# Patient Record
Sex: FEMALE | Race: WHITE | NOT HISPANIC OR LATINO | Employment: PART TIME | URBAN - METROPOLITAN AREA
[De-identification: names, ages, dates, MRNs, and addresses within clinical notes are randomized per-mention and may not be internally consistent; named-entity substitution may affect disease eponyms.]

---

## 2021-01-26 ENCOUNTER — HOSPITAL ENCOUNTER (EMERGENCY)
Facility: CLINIC | Age: 21
Discharge: HOME | End: 2021-01-27
Attending: FAMILY MEDICINE | Admitting: FAMILY MEDICINE
Payer: COMMERCIAL

## 2021-01-26 ENCOUNTER — APPOINTMENT (OUTPATIENT)
Dept: CT IMAGING | Facility: CLINIC | Age: 21
End: 2021-01-26
Attending: FAMILY MEDICINE
Payer: COMMERCIAL

## 2021-01-26 VITALS
RESPIRATION RATE: 16 BRPM | TEMPERATURE: 98.6 F | HEART RATE: 70 BPM | SYSTOLIC BLOOD PRESSURE: 110 MMHG | HEIGHT: 71 IN | WEIGHT: 170 LBS | DIASTOLIC BLOOD PRESSURE: 65 MMHG | BODY MASS INDEX: 23.8 KG/M2 | OXYGEN SATURATION: 99 %

## 2021-01-26 DIAGNOSIS — N39.0 UTI (URINARY TRACT INFECTION), BACTERIAL: ICD-10-CM

## 2021-01-26 DIAGNOSIS — R10.9 ABDOMINAL PAIN, UNSPECIFIED ABDOMINAL LOCATION: ICD-10-CM

## 2021-01-26 DIAGNOSIS — A49.9 UTI (URINARY TRACT INFECTION), BACTERIAL: ICD-10-CM

## 2021-01-26 PROBLEM — F90.0 ATTENTION DEFICIT HYPERACTIVITY DISORDER (ADHD), PREDOMINANTLY INATTENTIVE TYPE: Status: ACTIVE | Noted: 2018-08-02

## 2021-01-26 LAB
ALBUMIN SERPL-MCNC: 4.2 G/DL (ref 3.4–5)
ALBUMIN UR-MCNC: 30 MG/DL
ALP SERPL-CCNC: 109 U/L (ref 40–150)
ALT SERPL W P-5'-P-CCNC: 40 U/L (ref 0–50)
ANION GAP SERPL CALCULATED.3IONS-SCNC: 6 MMOL/L (ref 3–14)
APPEARANCE UR: ABNORMAL
AST SERPL W P-5'-P-CCNC: 25 U/L (ref 0–45)
BACTERIA #/AREA URNS HPF: ABNORMAL /HPF
BASOPHILS # BLD AUTO: 0.1 10E9/L (ref 0–0.2)
BASOPHILS NFR BLD AUTO: 0.9 %
BILIRUB SERPL-MCNC: 0.3 MG/DL (ref 0.2–1.3)
BILIRUB UR QL STRIP: NEGATIVE
BUN SERPL-MCNC: 18 MG/DL (ref 7–30)
CALCIUM SERPL-MCNC: 9.4 MG/DL (ref 8.5–10.1)
CHLORIDE SERPL-SCNC: 107 MMOL/L (ref 94–109)
CO2 SERPL-SCNC: 27 MMOL/L (ref 20–32)
COLOR UR AUTO: YELLOW
CREAT SERPL-MCNC: 0.76 MG/DL (ref 0.52–1.04)
DIFFERENTIAL METHOD BLD: NORMAL
EOSINOPHIL # BLD AUTO: 0.3 10E9/L (ref 0–0.7)
EOSINOPHIL NFR BLD AUTO: 3.2 %
ERYTHROCYTE [DISTWIDTH] IN BLOOD BY AUTOMATED COUNT: 11.9 % (ref 10–15)
GFR SERPL CREATININE-BSD FRML MDRD: >90 ML/MIN/{1.73_M2}
GLUCOSE SERPL-MCNC: 80 MG/DL (ref 70–99)
GLUCOSE UR STRIP-MCNC: NEGATIVE MG/DL
HCG UR QL: NEGATIVE
HCT VFR BLD AUTO: 37.9 % (ref 35–47)
HGB BLD-MCNC: 13.1 G/DL (ref 11.7–15.7)
HGB UR QL STRIP: ABNORMAL
IMM GRANULOCYTES # BLD: 0 10E9/L (ref 0–0.4)
IMM GRANULOCYTES NFR BLD: 0.2 %
KETONES UR STRIP-MCNC: NEGATIVE MG/DL
LEUKOCYTE ESTERASE UR QL STRIP: ABNORMAL
LIPASE SERPL-CCNC: 148 U/L (ref 73–393)
LYMPHOCYTES # BLD AUTO: 1.9 10E9/L (ref 0.8–5.3)
LYMPHOCYTES NFR BLD AUTO: 17.8 %
MCH RBC QN AUTO: 31.6 PG (ref 26.5–33)
MCHC RBC AUTO-ENTMCNC: 34.6 G/DL (ref 31.5–36.5)
MCV RBC AUTO: 91 FL (ref 78–100)
MONOCYTES # BLD AUTO: 0.8 10E9/L (ref 0–1.3)
MONOCYTES NFR BLD AUTO: 7.8 %
MUCOUS THREADS #/AREA URNS LPF: PRESENT /LPF
NEUTROPHILS # BLD AUTO: 7.5 10E9/L (ref 1.6–8.3)
NEUTROPHILS NFR BLD AUTO: 70.1 %
NITRATE UR QL: NEGATIVE
NRBC # BLD AUTO: 0 10*3/UL
NRBC BLD AUTO-RTO: 0 /100
PH UR STRIP: 5 PH (ref 5–7)
PLATELET # BLD AUTO: 321 10E9/L (ref 150–450)
POTASSIUM SERPL-SCNC: 3.8 MMOL/L (ref 3.4–5.3)
PROT SERPL-MCNC: 8.1 G/DL (ref 6.8–8.8)
RBC # BLD AUTO: 4.15 10E12/L (ref 3.8–5.2)
RBC #/AREA URNS AUTO: 12 /HPF (ref 0–2)
SODIUM SERPL-SCNC: 140 MMOL/L (ref 133–144)
SOURCE: ABNORMAL
SP GR UR STRIP: 1.03 (ref 1–1.03)
SQUAMOUS #/AREA URNS AUTO: 11 /HPF (ref 0–1)
UROBILINOGEN UR STRIP-MCNC: 0 MG/DL (ref 0–2)
WBC # BLD AUTO: 10.8 10E9/L (ref 4–11)
WBC #/AREA URNS AUTO: 20 /HPF (ref 0–5)

## 2021-01-26 PROCEDURE — 258N000003 HC RX IP 258 OP 636: Performed by: FAMILY MEDICINE

## 2021-01-26 PROCEDURE — 74176 CT ABD & PELVIS W/O CONTRAST: CPT

## 2021-01-26 PROCEDURE — 250N000013 HC RX MED GY IP 250 OP 250 PS 637: Performed by: FAMILY MEDICINE

## 2021-01-26 PROCEDURE — 96361 HYDRATE IV INFUSION ADD-ON: CPT | Performed by: FAMILY MEDICINE

## 2021-01-26 PROCEDURE — 85025 COMPLETE CBC W/AUTO DIFF WBC: CPT | Performed by: EMERGENCY MEDICINE

## 2021-01-26 PROCEDURE — 96375 TX/PRO/DX INJ NEW DRUG ADDON: CPT | Performed by: FAMILY MEDICINE

## 2021-01-26 PROCEDURE — 83690 ASSAY OF LIPASE: CPT | Performed by: EMERGENCY MEDICINE

## 2021-01-26 PROCEDURE — 250N000011 HC RX IP 250 OP 636: Performed by: FAMILY MEDICINE

## 2021-01-26 PROCEDURE — 80053 COMPREHEN METABOLIC PANEL: CPT | Performed by: EMERGENCY MEDICINE

## 2021-01-26 PROCEDURE — 81001 URINALYSIS AUTO W/SCOPE: CPT | Performed by: EMERGENCY MEDICINE

## 2021-01-26 PROCEDURE — 99284 EMERGENCY DEPT VISIT MOD MDM: CPT | Performed by: FAMILY MEDICINE

## 2021-01-26 PROCEDURE — 81025 URINE PREGNANCY TEST: CPT | Performed by: EMERGENCY MEDICINE

## 2021-01-26 PROCEDURE — 96374 THER/PROPH/DIAG INJ IV PUSH: CPT | Performed by: FAMILY MEDICINE

## 2021-01-26 PROCEDURE — 99285 EMERGENCY DEPT VISIT HI MDM: CPT | Mod: 25 | Performed by: FAMILY MEDICINE

## 2021-01-26 RX ORDER — SULFAMETHOXAZOLE/TRIMETHOPRIM 800-160 MG
1 TABLET ORAL ONCE
Status: COMPLETED | OUTPATIENT
Start: 2021-01-26 | End: 2021-01-26

## 2021-01-26 RX ORDER — ONDANSETRON 2 MG/ML
4 INJECTION INTRAMUSCULAR; INTRAVENOUS ONCE
Status: COMPLETED | OUTPATIENT
Start: 2021-01-26 | End: 2021-01-26

## 2021-01-26 RX ORDER — KETOROLAC TROMETHAMINE 15 MG/ML
10 INJECTION, SOLUTION INTRAMUSCULAR; INTRAVENOUS ONCE
Status: COMPLETED | OUTPATIENT
Start: 2021-01-26 | End: 2021-01-26

## 2021-01-26 RX ORDER — SULFAMETHOXAZOLE/TRIMETHOPRIM 800-160 MG
1 TABLET ORAL 2 TIMES DAILY
Qty: 20 TABLET | Refills: 0 | Status: SHIPPED | OUTPATIENT
Start: 2021-01-26 | End: 2021-01-29

## 2021-01-26 RX ORDER — ONDANSETRON 2 MG/ML
4 INJECTION INTRAMUSCULAR; INTRAVENOUS
Status: DISCONTINUED | OUTPATIENT
Start: 2021-01-26 | End: 2021-01-27 | Stop reason: HOSPADM

## 2021-01-26 RX ORDER — DEXTROAMPHETAMINE SACCHARATE, AMPHETAMINE ASPARTATE MONOHYDRATE, DEXTROAMPHETAMINE SULFATE AND AMPHETAMINE SULFATE 2.5; 2.5; 2.5; 2.5 MG/1; MG/1; MG/1; MG/1
10 CAPSULE, EXTENDED RELEASE ORAL DAILY
COMMUNITY
Start: 2020-10-31

## 2021-01-26 RX ADMIN — SODIUM CHLORIDE, POTASSIUM CHLORIDE, SODIUM LACTATE AND CALCIUM CHLORIDE 1000 ML: 600; 310; 30; 20 INJECTION, SOLUTION INTRAVENOUS at 22:35

## 2021-01-26 RX ADMIN — KETOROLAC TROMETHAMINE 10 MG: 15 INJECTION, SOLUTION INTRAMUSCULAR; INTRAVENOUS at 22:36

## 2021-01-26 RX ADMIN — SULFAMETHOXAZOLE AND TRIMETHOPRIM 1 TABLET: 800; 160 TABLET ORAL at 23:52

## 2021-01-26 RX ADMIN — ONDANSETRON 4 MG: 2 INJECTION INTRAMUSCULAR; INTRAVENOUS at 22:36

## 2021-01-26 ASSESSMENT — MIFFLIN-ST. JEOR: SCORE: 1637.24

## 2021-01-27 NOTE — ED PROVIDER NOTES
History     Chief Complaint   Patient presents with     Abdominal Pain     ruq pain and radiating to luq and back, nausea, no fevers     FLORI Noonan is a 21 year old female, past medical history is significant for ADHD, cognitive impairment, postconcussive headaches, vestibular dysfunction, depression, vasovagal syncope, presents to the emergency department concerns of abdominal pain beginning yesterday morning shortly after awakening but before eating.  The pain was initially is described as a sharp stabbing pain in the right upper quadrant almost like a rib was moving, it radiated gradually over the course of the next 24 to 36 hours across the abdomen and into the left side.  Through the back.  Mild nausea, no vomiting.  Denies fever chills or sweats.  No change in bowel habits specifically no constipation or diarrhea she could not recall her last bowel movement.  She notes no urinary tract symptoms such as frequency, urgency or dysuria.  Eating and drinking has no effect on the pain.  She has not taken anything for the pain.  She notes discomfort with bending forward or rotating through the chest and abdomen but denies any trauma or injury no lifting straining or bending that she could have injured her right anterolateral rib/chest area.  She has never had pain like this before.  Last menstrual period was 1 week ago she denies the possibility of pregnancy.    Allergies:  No Known Allergies    Problem List:    Patient Active Problem List    Diagnosis Date Noted     Attention deficit hyperactivity disorder (ADHD), predominantly inattentive type 08/02/2018     Priority: Medium     Cognitive impairment 12/14/2016     Priority: Medium     Post-concussion headache 04/13/2016     Priority: Medium     Vestibular dysfunction 04/13/2016     Priority: Medium     Vision disturbance 04/13/2016     Priority: Medium     Depression 08/03/2015     Priority: Medium     Seeing therapist at [facility] ([facility]).  "Started on Zoloft 50 mg (2/17/17). Will follow-up in 2-3 weeks       Concussion with no loss of consciousness 12/16/2014     Priority: Medium     Post concussion syndrome 12/14/2014     Priority: Medium     Seen by concussion clinic 12/8/14, after 3 days symptom free at rest is to retake the impact test       Chalazion 08/16/2014     Priority: Medium     Orthostatic hypotension 08/16/2014     Priority: Medium     Other acne 10/23/2012     Priority: Medium     Vaso vagal episode 02/11/2012     Priority: Medium     Has had several fainting spells. Evaluated by peds cardiology in 2010 - consistent with vaso-vagal syncope. Tall stature but no other stigmata of Marfan, no family history of Marfan syndrome.  Evaluated again 2015-see scan, holter moniter ordered and was normal, ekg normal, increase hydration and salt intake, check lipid profile at next well exam-no need for cardiology follow up          Past Medical History:    No past medical history on file.    Past Surgical History:    No past surgical history on file.    Family History:    No family history on file.    Social History:  Marital Status:  Single [1]  Social History     Tobacco Use     Smoking status: Not on file   Substance Use Topics     Alcohol use: Not on file     Drug use: Not on file        Medications:         amphetamine-dextroamphetamine (ADDERALL XR) 10 MG 24 hr capsule          Review of Systems   All other systems reviewed and are negative.      Physical Exam   BP: (!) 147/89  Pulse: 79  Temp: 98.6  F (37  C)  Height: 180.3 cm (5' 11\")  Weight: 77.1 kg (170 lb)  SpO2: 98 %      Physical Exam  Vitals signs and nursing note reviewed.   Constitutional:       General: She is not in acute distress.     Appearance: She is well-developed. She is obese. She is not ill-appearing.   HENT:      Head: Normocephalic and atraumatic.      Mouth/Throat:      Mouth: Mucous membranes are moist.   Eyes:      Extraocular Movements: Extraocular movements intact.    "   Pupils: Pupils are equal, round, and reactive to light.   Cardiovascular:      Rate and Rhythm: Normal rate and regular rhythm.      Heart sounds: Normal heart sounds.   Pulmonary:      Effort: Pulmonary effort is normal.      Breath sounds: Normal breath sounds.   Abdominal:      Comments: Soft obese abdomen, active bowel sounds.  Tender right upper quadrant and right anterolateral chest area, no skin changes no rash.  Tender in the right upper quadrant, right lower quadrant, left lower quadrant and left upper quadrant.  Voluntary guarding.  No rebound, distractible.  No CVA tenderness.   Skin:     General: Skin is warm and dry.      Capillary Refill: Capillary refill takes less than 2 seconds.   Neurological:      General: No focal deficit present.      Mental Status: She is alert.   Psychiatric:         Mood and Affect: Mood normal.         Behavior: Behavior normal.         ED Course        Procedures               Critical Care time:  none               Results for orders placed or performed during the hospital encounter of 01/26/21 (from the past 24 hour(s))   HCG qualitative urine (UPT)   Result Value Ref Range    HCG Qual Urine Negative NEG^Negative   CBC with platelets differential   Result Value Ref Range    WBC 10.8 4.0 - 11.0 10e9/L    RBC Count 4.15 3.8 - 5.2 10e12/L    Hemoglobin 13.1 11.7 - 15.7 g/dL    Hematocrit 37.9 35.0 - 47.0 %    MCV 91 78 - 100 fl    MCH 31.6 26.5 - 33.0 pg    MCHC 34.6 31.5 - 36.5 g/dL    RDW 11.9 10.0 - 15.0 %    Platelet Count 321 150 - 450 10e9/L    Diff Method Automated Method     % Neutrophils 70.1 %    % Lymphocytes 17.8 %    % Monocytes 7.8 %    % Eosinophils 3.2 %    % Basophils 0.9 %    % Immature Granulocytes 0.2 %    Nucleated RBCs 0 0 /100    Absolute Neutrophil 7.5 1.6 - 8.3 10e9/L    Absolute Lymphocytes 1.9 0.8 - 5.3 10e9/L    Absolute Monocytes 0.8 0.0 - 1.3 10e9/L    Absolute Eosinophils 0.3 0.0 - 0.7 10e9/L    Absolute Basophils 0.1 0.0 - 0.2 10e9/L    Abs  Immature Granulocytes 0.0 0 - 0.4 10e9/L    Absolute Nucleated RBC 0.0        Medications   ondansetron (ZOFRAN) injection 4 mg (has no administration in time range)   11:44 PM  Recheck on the patient at this time finds her comfortable.  We reviewed lab diagnostics as well as imaging.  Her urine appears somewhat contaminated with squamous epithelial cells, per discussion with the patient I will empirically cover her with a short course of antibiotic and observe for improvement although I suspect that her abdominal pain may be related to abdominal wall unappreciated injury or strain as a much more likely explanation given the behavior historically and her largely negative evaluation today.  I reassured her about the lack of concerning findings, no evidence to support a medical or surgical emergency and the disposition is to home.  Return as needed.    Assessments & Plan (with Medical Decision Making)   Assessments and plan with medical decision making at the time stamp above.    Disclaimer: This note consists of symbols derived from keyboarding, dictation and/or voice recognition software. As a result, there may be errors in the script that have gone undetected. Please consider this when interpreting information found in this chart.      I have reviewed the nursing notes.    I have reviewed the findings, diagnosis, plan and need for follow up with the patient.          New Prescriptions    No medications on file       Final diagnoses:   None       1/26/2021   [facility] EMERGENCY DEPT     Steve Thompson MD  01/26/21 [id number]

## 2021-01-27 NOTE — ED NOTES
"Pt c/o RUQ pain that radiates to LUQ and bilat upper back, started yesterday morning. Nausea no vomiting, no diarrhea, last BM this AM. No change in pain with eating. Pain described at \"constricting\". Pain is constant, getting worse. Pain worse with movement.   "

## 2022-05-11 ENCOUNTER — HOSPITAL ENCOUNTER (EMERGENCY)
Facility: CLINIC | Age: 22
Discharge: HOME | End: 2022-05-11
Attending: EMERGENCY MEDICINE | Admitting: EMERGENCY MEDICINE
Payer: COMMERCIAL

## 2022-05-11 VITALS
DIASTOLIC BLOOD PRESSURE: 88 MMHG | RESPIRATION RATE: 18 BRPM | HEART RATE: 77 BPM | TEMPERATURE: 97.5 F | OXYGEN SATURATION: 100 % | WEIGHT: 190 LBS | HEIGHT: 71 IN | SYSTOLIC BLOOD PRESSURE: 137 MMHG | BODY MASS INDEX: 26.6 KG/M2

## 2022-05-11 DIAGNOSIS — R09.A2 FOREIGN BODY SENSATION IN THROAT: ICD-10-CM

## 2022-05-11 PROCEDURE — 99284 EMERGENCY DEPT VISIT MOD MDM: CPT | Performed by: EMERGENCY MEDICINE

## 2022-05-11 PROCEDURE — 99283 EMERGENCY DEPT VISIT LOW MDM: CPT | Performed by: EMERGENCY MEDICINE

## 2022-05-11 RX ORDER — IBUPROFEN 200 MG
600-800 TABLET ORAL EVERY 8 HOURS PRN
Qty: 30 TABLET | Refills: 0 | COMMUNITY
Start: 2022-05-11 | End: 2022-05-16

## 2022-05-12 ASSESSMENT — ENCOUNTER SYMPTOMS
ABDOMINAL PAIN: 0
FATIGUE: 0
TROUBLE SWALLOWING: 0
DIARRHEA: 0
FEVER: 0
APPETITE CHANGE: 0
COUGH: 0
SHORTNESS OF BREATH: 0
BACK PAIN: 0
CHEST TIGHTNESS: 0
LIGHT-HEADEDNESS: 0
SORE THROAT: 0
NERVOUS/ANXIOUS: 0
HEADACHES: 0
CHILLS: 0
NAUSEA: 0
VOICE CHANGE: 0
VOMITING: 0

## 2022-05-12 NOTE — ED TRIAGE NOTES
Pt feels like something in stuck on right side of throat.  No difficulty talking or breathing at this time   Triage Assessment     Row Name 05/11/22 2124       Triage Assessment (Adult)    Airway WDL WDL       Respiratory WDL    Respiratory WDL WDL       Skin Circulation/Temperature WDL    Skin Circulation/Temperature WDL WDL       Cognitive/Neuro/Behavioral WDL    Cognitive/Neuro/Behavioral WDL WDL

## 2022-05-12 NOTE — DISCHARGE INSTRUCTIONS
The exact cause of your symptoms is unclear at this time.  If your symptoms continue or worsen, please follow-up with your primary care doctor as soon as possible or return to the emergency department for a repeat evaluation.

## 2022-05-12 NOTE — ED PROVIDER NOTES
History     Chief Complaint   Patient presents with     Swallowed Foreign Body     HPI  Radha Noonan is a 22 year old female with no significant contributing past medical history presenting for evaluation of foreign body sensation in her throat.  Patient reports she was feeling well during the day.  Last ate around 3 PM and around 7 PM started to feel a swelling sensation in the right side of her throat.  Patient felt the sensation deep in her throat on the right side in her neck.  Reports some discomfort with swallowing but no distinct pain.  Patient reports she felt the swelling progress over the course of the next hour or so but has since plateaued with no further worsening.  She continues to feel that sensation of a swelling pressure on the right side of her throat but has no difficulty swallowing or breathing.  Patient reports she tried drinking some water and eating some Doritos chips and these were swallowed without difficulty.  Denies any significant pain with swallowing.  Denies any cough or difficulty breathing.  Patient was concerned however that the swelling might continue and block off her airway which is what prompted her to come in for evaluation.  She was concerned about possible allergic reaction but has no other symptoms.  She denies any rash, pruritus, nausea, GI discomfort, or swelling.  No known allergy exposure.    Allergies:  No Known Allergies    Problem List:    Patient Active Problem List    Diagnosis Date Noted     Attention deficit hyperactivity disorder (ADHD), predominantly inattentive type 08/02/2018     Priority: Medium     Cognitive impairment 12/14/2016     Priority: Medium     Post-concussion headache 04/13/2016     Priority: Medium     Vestibular dysfunction 04/13/2016     Priority: Medium     Vision disturbance 04/13/2016     Priority: Medium     Depression 08/03/2015     Priority: Medium     Seeing therapist at [facility] ([facility] Started on Zoloft 50 mg (2/17/17).  Will follow-up in 2-3 weeks       Concussion with no loss of consciousness 12/16/2014     Priority: Medium     Post concussion syndrome 12/14/2014     Priority: Medium     Seen by concussion clinic 12/8/14, after 3 days symptom free at rest is to retake the impact test       Chalazion 08/16/2014     Priority: Medium     Orthostatic hypotension 08/16/2014     Priority: Medium     Other acne 10/23/2012     Priority: Medium     Vaso vagal episode 02/11/2012     Priority: Medium     Has had several fainting spells. Evaluated by peds cardiology in 2010 - consistent with vaso-vagal syncope. Tall stature but no other stigmata of Marfan, no family history of Marfan syndrome.  Evaluated again 2015-see scan, holter moniter ordered and was normal, ekg normal, increase hydration and salt intake, check lipid profile at next well exam-no need for cardiology follow up          Past Medical History:    No past medical history on file.    Past Surgical History:    No past surgical history on file.    Family History:    No family history on file.    Social History:  Marital Status:  Single [1]        Medications:    ibuprofen (ADVIL/MOTRIN) 200 MG tablet  amphetamine-dextroamphetamine (ADDERALL XR) 10 MG 24 hr capsule          Review of Systems   Constitutional: Negative for appetite change, chills, fatigue and fever.   HENT: Negative for congestion, sore throat, trouble swallowing and voice change.         Abnormal foreign body sensation on the right side of her throat   Respiratory: Negative for cough, chest tightness and shortness of breath.    Cardiovascular: Negative for chest pain.   Gastrointestinal: Negative for abdominal pain, diarrhea, nausea and vomiting.   Genitourinary: Negative for decreased urine volume.   Musculoskeletal: Negative for back pain.   Skin: Negative for rash.   Neurological: Negative for light-headedness and headaches.   Psychiatric/Behavioral: The patient is not nervous/anxious.    All other systems  "reviewed and are negative.      Physical Exam   BP: 137/88  Pulse: 77  Temp: 97.5  F (36.4  C)  Resp: 18  Height: 180.3 cm (5' 11\")  Weight: 86.2 kg (190 lb)  SpO2: 100 %      Physical Exam  Vitals and nursing note reviewed.   Constitutional:       Appearance: Normal appearance. She is not ill-appearing or diaphoretic.      Comments: Awake and alert laying semi-reclined in no distress   HENT:      Head: Atraumatic.      Nose: Nose normal.      Mouth/Throat:      Mouth: Mucous membranes are moist.      Pharynx: Oropharynx is clear. No oropharyngeal exudate or posterior oropharyngeal erythema.      Comments: No visible tonsillar hypertrophy  Eyes:      Conjunctiva/sclera: Conjunctivae normal.   Neck:      Trachea: Phonation normal. No tracheal tenderness, abnormal tracheal secretions or tracheal deviation.      Comments: Unable to palpate any abnormal mass in the neck.  Normal carotid pulses.  Auscultation does not reveal an abnormal vascular sound  Cardiovascular:      Rate and Rhythm: Normal rate.      Pulses: Normal pulses.   Pulmonary:      Effort: Pulmonary effort is normal.      Breath sounds: Normal breath sounds.   Abdominal:      Palpations: Abdomen is soft.      Tenderness: There is no abdominal tenderness.   Musculoskeletal:         General: Normal range of motion.      Cervical back: Full passive range of motion without pain, normal range of motion and neck supple. No tenderness. Normal range of motion.   Lymphadenopathy:      Cervical: No cervical adenopathy.   Skin:     General: Skin is warm and dry.      Capillary Refill: Capillary refill takes less than 2 seconds.   Neurological:      Mental Status: She is alert and oriented to person, place, and time.   Psychiatric:         Mood and Affect: Mood normal.         ED Course                 Procedures                No results found for this or any previous visit (from the past 24 hour(s)).    Medications - No data to display    Assessments & Plan (with " Medical Decision Making)  Well-appearing 22-year-old presenting for evaluation of foreign body sensation in her throat.  Has been able to swallow solids and liquids without difficulty and is breathing normally with normal phonation.  No visible abnormality within the oropharynx and no palpable abnormality within the neck.  Symptoms occurred spontaneously without recent ingestion.  Considerations include infectious cause, foreign body, vascular aneurysm, lymphadenopathy. The exact cause of symptoms unclear but symptoms have not been worsening over the past several hours.  She is afebrile in no distress, swallowing and breathing without difficulty.  No change in voice.  Recommended rest and monitoring with strict return precautions if new or concerning symptoms develop.     I have reviewed the nursing notes.    I have reviewed the findings, diagnosis, plan and need for follow up with the patient.       Discharge Medication List as of 5/11/2022 11:26 PM      START taking these medications    Details   ibuprofen (ADVIL/MOTRIN) 200 MG tablet Take 3-4 tablets (600-800 mg) by mouth every 8 hours as needed for mild pain, Disp-30 tablet, R-0, OTC             Final diagnoses:   Foreign body sensation in throat - on right side       5/11/2022   [facility] EMERGENCY DEPT     Hernandez, Tc Scruggs MD  05/12/22 [id number]

## 2022-10-21 ENCOUNTER — HOSPITAL ENCOUNTER (EMERGENCY)
Facility: CLINIC | Age: 22
Discharge: HOME | End: 2022-10-21
Attending: FAMILY MEDICINE | Admitting: FAMILY MEDICINE
Payer: COMMERCIAL

## 2022-10-21 VITALS
WEIGHT: 190 LBS | SYSTOLIC BLOOD PRESSURE: 116 MMHG | TEMPERATURE: 97.3 F | HEART RATE: 67 BPM | DIASTOLIC BLOOD PRESSURE: 68 MMHG | RESPIRATION RATE: 18 BRPM | HEIGHT: 71 IN | OXYGEN SATURATION: 99 % | BODY MASS INDEX: 26.6 KG/M2

## 2022-10-21 DIAGNOSIS — R10.13 EPIGASTRIC PAIN: ICD-10-CM

## 2022-10-21 LAB
ALBUMIN SERPL BCG-MCNC: 4.3 G/DL (ref 3.5–5.2)
ALP SERPL-CCNC: 100 U/L (ref 35–104)
ALT SERPL W P-5'-P-CCNC: 14 U/L (ref 10–35)
ANION GAP SERPL CALCULATED.3IONS-SCNC: 10 MMOL/L (ref 7–15)
AST SERPL W P-5'-P-CCNC: 16 U/L (ref 10–35)
BASOPHILS # BLD AUTO: 0.1 10E3/UL (ref 0–0.2)
BASOPHILS NFR BLD AUTO: 1 %
BILIRUB SERPL-MCNC: 0.3 MG/DL
BUN SERPL-MCNC: 11.5 MG/DL (ref 6–20)
CALCIUM SERPL-MCNC: 9.2 MG/DL (ref 8.6–10)
CHLORIDE SERPL-SCNC: 107 MMOL/L (ref 98–107)
CREAT SERPL-MCNC: 0.77 MG/DL (ref 0.51–0.95)
DEPRECATED HCO3 PLAS-SCNC: 23 MMOL/L (ref 22–29)
EOSINOPHIL # BLD AUTO: 0.2 10E3/UL (ref 0–0.7)
EOSINOPHIL NFR BLD AUTO: 2 %
ERYTHROCYTE [DISTWIDTH] IN BLOOD BY AUTOMATED COUNT: 11.9 % (ref 10–15)
GFR SERPL CREATININE-BSD FRML MDRD: >90 ML/MIN/1.73M2
GLUCOSE SERPL-MCNC: 97 MG/DL (ref 70–99)
HCG SERPL QL: NEGATIVE
HCT VFR BLD AUTO: 39.2 % (ref 35–47)
HGB BLD-MCNC: 13.1 G/DL (ref 11.7–15.7)
HOLD SPECIMEN: NORMAL
IMM GRANULOCYTES # BLD: 0 10E3/UL
IMM GRANULOCYTES NFR BLD: 0 %
LIPASE SERPL-CCNC: 33 U/L (ref 13–60)
LYMPHOCYTES # BLD AUTO: 1.4 10E3/UL (ref 0.8–5.3)
LYMPHOCYTES NFR BLD AUTO: 19 %
MCH RBC QN AUTO: 30.3 PG (ref 26.5–33)
MCHC RBC AUTO-ENTMCNC: 33.4 G/DL (ref 31.5–36.5)
MCV RBC AUTO: 91 FL (ref 78–100)
MONOCYTES # BLD AUTO: 0.6 10E3/UL (ref 0–1.3)
MONOCYTES NFR BLD AUTO: 8 %
NEUTROPHILS # BLD AUTO: 5.2 10E3/UL (ref 1.6–8.3)
NEUTROPHILS NFR BLD AUTO: 70 %
NRBC # BLD AUTO: 0 10E3/UL
NRBC BLD AUTO-RTO: 0 /100
PLATELET # BLD AUTO: 297 10E3/UL (ref 150–450)
POTASSIUM SERPL-SCNC: 4.3 MMOL/L (ref 3.4–5.3)
PROT SERPL-MCNC: 7.5 G/DL (ref 6.4–8.3)
RBC # BLD AUTO: 4.32 10E6/UL (ref 3.8–5.2)
SODIUM SERPL-SCNC: 140 MMOL/L (ref 136–145)
WBC # BLD AUTO: 7.4 10E3/UL (ref 4–11)

## 2022-10-21 PROCEDURE — 82040 ASSAY OF SERUM ALBUMIN: CPT | Performed by: FAMILY MEDICINE

## 2022-10-21 PROCEDURE — 258N000003 HC RX IP 258 OP 636: Performed by: FAMILY MEDICINE

## 2022-10-21 PROCEDURE — 250N000011 HC RX IP 250 OP 636: Performed by: FAMILY MEDICINE

## 2022-10-21 PROCEDURE — 83690 ASSAY OF LIPASE: CPT | Performed by: FAMILY MEDICINE

## 2022-10-21 PROCEDURE — 36415 COLL VENOUS BLD VENIPUNCTURE: CPT | Performed by: FAMILY MEDICINE

## 2022-10-21 PROCEDURE — 99284 EMERGENCY DEPT VISIT MOD MDM: CPT | Performed by: FAMILY MEDICINE

## 2022-10-21 PROCEDURE — 96361 HYDRATE IV INFUSION ADD-ON: CPT | Performed by: FAMILY MEDICINE

## 2022-10-21 PROCEDURE — 85025 COMPLETE CBC W/AUTO DIFF WBC: CPT | Performed by: FAMILY MEDICINE

## 2022-10-21 PROCEDURE — 96374 THER/PROPH/DIAG INJ IV PUSH: CPT | Performed by: FAMILY MEDICINE

## 2022-10-21 PROCEDURE — 99284 EMERGENCY DEPT VISIT MOD MDM: CPT | Mod: 25 | Performed by: FAMILY MEDICINE

## 2022-10-21 PROCEDURE — C9113 INJ PANTOPRAZOLE SODIUM, VIA: HCPCS | Performed by: FAMILY MEDICINE

## 2022-10-21 PROCEDURE — 80053 COMPREHEN METABOLIC PANEL: CPT | Performed by: FAMILY MEDICINE

## 2022-10-21 PROCEDURE — 84703 CHORIONIC GONADOTROPIN ASSAY: CPT | Performed by: FAMILY MEDICINE

## 2022-10-21 RX ORDER — PANTOPRAZOLE SODIUM 40 MG/1
40 TABLET, DELAYED RELEASE ORAL DAILY
Qty: 14 TABLET | Refills: 0 | Status: SHIPPED | OUTPATIENT
Start: 2022-10-21 | End: 2022-11-04

## 2022-10-21 RX ADMIN — SODIUM CHLORIDE, POTASSIUM CHLORIDE, SODIUM LACTATE AND CALCIUM CHLORIDE 1000 ML: 600; 310; 30; 20 INJECTION, SOLUTION INTRAVENOUS at 14:16

## 2022-10-21 RX ADMIN — PANTOPRAZOLE SODIUM 40 MG: 40 INJECTION, POWDER, FOR SOLUTION INTRAVENOUS at 14:16

## 2022-10-21 ASSESSMENT — ACTIVITIES OF DAILY LIVING (ADL)
ADLS_ACUITY_SCORE: 35
ADLS_ACUITY_SCORE: 35

## 2022-10-21 NOTE — ED NOTES
Pt continues to experience abdominal pain located in ULQ and radiates directly to upper back (L side). Pt states that pain is constant but will increase to severe intervals after eating or moving.   Pt denies nausea when sitting but present with movement. Pt vomited this morning.   Pt denies diarrhea.

## 2022-10-21 NOTE — ED PROVIDER NOTES
History     Chief Complaint   Patient presents with     Abdominal Pain     HPI  Radha Noonan is a 22 year old female, past medical history significant for ADHD, cognitive impairment, depression, orthostatic hypotension, presents to the emergency department concerns of abdominal pain.  History is obtained from the patient who identifies onset of epigastric/left upper quadrant abdominal pain shortly after drinking some water this morning just prior to arrival.  Severe 10 on 10 pain sharp stabbing burning aching without radiation up into the chest but through to the back and also to the left side.  1 episode of emesis with some improvement.  She tried eating some chicken soup and also a peanut since the time and was able to keep those down but eating seem to make the pain worse.  Her pain currently is about a 3/10 and she declined medication for pain.  She has not had pain like this before.  She drank considerable alcohol last night by her account mixed drinks to 2 or 3.  Nothing this morning.  Last bowel movement was yesterday before onset of symptoms.  No BM today.  No difficulties with urination.    Allergies:  No Known Allergies    Problem List:    Patient Active Problem List    Diagnosis Date Noted     Attention deficit hyperactivity disorder (ADHD), predominantly inattentive type 08/02/2018     Priority: Medium     Cognitive impairment 12/14/2016     Priority: Medium     Post-concussion headache 04/13/2016     Priority: Medium     Vestibular dysfunction 04/13/2016     Priority: Medium     Vision disturbance 04/13/2016     Priority: Medium     Depression 08/03/2015     Priority: Medium     Seeing therapist at [facility] ([facility] Started on Zoloft 50 mg (2/17/17). Will follow-up in 2-3 weeks       Concussion with no loss of consciousness 12/16/2014     Priority: Medium     Post concussion syndrome 12/14/2014     Priority: Medium     Seen by concussion clinic 12/8/14, after 3 days symptom free at rest  "is to retake the impact test       Chalazion 08/16/2014     Priority: Medium     Orthostatic hypotension 08/16/2014     Priority: Medium     Other acne 10/23/2012     Priority: Medium     Vaso vagal episode 02/11/2012     Priority: Medium     Has had several fainting spells. Evaluated by peds cardiology in 2010 - consistent with vaso-vagal syncope. Tall stature but no other stigmata of Marfan, no family history of Marfan syndrome.  Evaluated again 2015-see scan, holter moniter ordered and was normal, ekg normal, increase hydration and salt intake, check lipid profile at next well exam-no need for cardiology follow up          Past Medical History:    No past medical history on file.    Past Surgical History:    No past surgical history on file.    Family History:    No family history on file.    Social History:  Marital Status:  Single [1]        Medications:    pantoprazole (PROTONIX) 40 MG EC tablet  amphetamine-dextroamphetamine (ADDERALL XR) 10 MG 24 hr capsule          Review of Systems   All other systems reviewed and are negative.      Physical Exam   BP: (!) 162/91  Pulse: 94  Temp: 97.3  F (36.3  C)  Resp: 18  Height: 180.3 cm (5' 11\")  Weight: 86.2 kg (190 lb)  SpO2: 98 %      Physical Exam  Vitals and nursing note reviewed.   Constitutional:       General: She is not in acute distress.     Appearance: She is well-developed. She is not ill-appearing.   HENT:      Head: Normocephalic and atraumatic.      Mouth/Throat:      Mouth: Mucous membranes are moist.      Pharynx: Oropharynx is clear.   Eyes:      Extraocular Movements: Extraocular movements intact.      Pupils: Pupils are equal, round, and reactive to light.   Cardiovascular:      Rate and Rhythm: Normal rate and regular rhythm.      Heart sounds: Normal heart sounds.   Pulmonary:      Effort: Pulmonary effort is normal.      Breath sounds: Normal breath sounds.   Abdominal:      Comments: Soft, bowel sounds present, tender epigastrium and left " upper quadrant with voluntary guarding.  No rebound no referred pain.  No CVA tenderness.   Skin:     General: Skin is warm and dry.      Capillary Refill: Capillary refill takes less than 2 seconds.   Neurological:      General: No focal deficit present.      Mental Status: She is alert.   Psychiatric:         Mood and Affect: Mood normal.         Behavior: Behavior normal.         ED Course                 Procedures              Critical Care time:  none               Results for orders placed or performed during the hospital encounter of 10/21/22 (from the past 24 hour(s))   [place] Draw    Narrative    The following orders were created for panel order [place] Draw.  Procedure                               Abnormality         Status                     ---------                               -----------         ------                     Extra Blue Top Tube[[id number]]                              Final result               Extra Red Top Tube[[id number]]                               Final result               Extra Green Top (Lithium...[[id number]]                      Final result               Extra Purple Top Tube[[id number]]                            Final result                 Please view results for these tests on the individual orders.   Extra Blue Top Tube   Result Value Ref Range    Hold Specimen JIC    Extra Red Top Tube   Result Value Ref Range    Hold Specimen JIC    Extra Green Top (Lithium Heparin) Tube   Result Value Ref Range    Hold Specimen JIC    Extra Purple Top Tube   Result Value Ref Range    Hold Specimen JIC    CBC with platelets, differential    Narrative    The following orders were created for panel order CBC with platelets, differential.  Procedure                               Abnormality         Status                     ---------                               -----------         ------                     CBC with platelets and d...[[id number]]                      Final result                  Please view results for these tests on the individual orders.   Comprehensive metabolic panel   Result Value Ref Range    Sodium 140 136 - 145 mmol/L    Potassium 4.3 3.4 - 5.3 mmol/L    Chloride 107 98 - 107 mmol/L    Carbon Dioxide (CO2) 23 22 - 29 mmol/L    Anion Gap 10 7 - 15 mmol/L    Urea Nitrogen 11.5 6.0 - 20.0 mg/dL    Creatinine 0.77 0.51 - 0.95 mg/dL    Calcium 9.2 8.6 - 10.0 mg/dL    Glucose 97 70 - 99 mg/dL    Alkaline Phosphatase 100 35 - 104 U/L    AST 16 10 - 35 U/L    ALT 14 10 - 35 U/L    Protein Total 7.5 6.4 - 8.3 g/dL    Albumin 4.3 3.5 - 5.2 g/dL    Bilirubin Total 0.3 <=1.2 mg/dL    GFR Estimate >90 >60 mL/min/1.73m2   Lipase   Result Value Ref Range    Lipase 33 13 - 60 U/L   HCG qualitative pregnancy (blood)   Result Value Ref Range    hCG Serum Qualitative Negative Negative   CBC with platelets and differential   Result Value Ref Range    WBC Count 7.4 4.0 - 11.0 10e3/uL    RBC Count 4.32 3.80 - 5.20 10e6/uL    Hemoglobin 13.1 11.7 - 15.7 g/dL    Hematocrit 39.2 35.0 - 47.0 %    MCV 91 78 - 100 fL    MCH 30.3 26.5 - 33.0 pg    MCHC 33.4 31.5 - 36.5 g/dL    RDW 11.9 10.0 - 15.0 %    Platelet Count 297 150 - 450 10e3/uL    % Neutrophils 70 %    % Lymphocytes 19 %    % Monocytes 8 %    % Eosinophils 2 %    % Basophils 1 %    % Immature Granulocytes 0 %    NRBCs per 100 WBC 0 <1 /100    Absolute Neutrophils 5.2 1.6 - 8.3 10e3/uL    Absolute Lymphocytes 1.4 0.8 - 5.3 10e3/uL    Absolute Monocytes 0.6 0.0 - 1.3 10e3/uL    Absolute Eosinophils 0.2 0.0 - 0.7 10e3/uL    Absolute Basophils 0.1 0.0 - 0.2 10e3/uL    Absolute Immature Granulocytes 0.0 <=0.4 10e3/uL    Absolute NRBCs 0.0 10e3/uL   [place] Draw    Narrative    The following orders were created for panel order [place] Draw.  Procedure                               Abnormality         Status                     ---------                               -----------         ------                     Extra Green Top  (Lithium...[[id number]]                      Final result                 Please view results for these tests on the individual orders.   Extra Green Top (Lithium Heparin) ON ICE   Result Value Ref Range    Hold Specimen JIC      5:00 PM  Normal labs and the patient's symptoms have improved down to a 1-2/2010 after the IV Protonix and no other medications given.  Given her history I suspect that her abdominal pain is related to alcoholic indulgence last night and some irritation of her stomach and esophagus in the form of acute alcoholic gastritis/esophagitis.  I have asked her to stop alcohol intake and I have placed her on Protonix once daily for 14 days.  We discussed the possibility of further evaluation at this time however my suspicion is quite low for serious intra-abdominal pathology and I do not feel that at this time I would obtain ultrasound of the abdomen to evaluate for the possibility of atypical biliary colic/cholecystitis.  Her white cell count is normal and her LFTs are normal as well.  I think this would be of low yield but I did discuss it with the patient.  We will plan on 2 weeks of the PPI, avoidance of alcohol if symptoms persist or worsen she will return to the ER otherwise to follow-up in clinic with her primary care provider.    Medications   lactated ringers BOLUS 1,000 mL (1,000 mLs Intravenous New Bag 10/21/22 1416)   pantoprazole (PROTONIX) IV push injection 40 mg (40 mg Intravenous Given 10/21/22 1416)       Assessments & Plan (with Medical Decision Making)   Assessments and plan with medical decision making at the time stamp above    Disclaimer: This note consists of symbols derived from keyboarding, dictation and/or voice recognition software. As a result, there may be errors in the script that have gone undetected. Please consider this when interpreting information found in this chart.      I have reviewed the nursing notes.    I have reviewed the findings, diagnosis, plan and need  for follow up with the patient.       New Prescriptions    PANTOPRAZOLE (PROTONIX) 40 MG EC TABLET    Take 1 tablet (40 mg) by mouth daily for 14 doses       Final diagnoses:   Epigastric pain - Suspect gastritis       10/21/2022   [facility] EMERGENCY DEPT     [facility], Steve Workman MD  10/21/22 1261

## 2022-10-21 NOTE — DISCHARGE INSTRUCTIONS
As I discussed with you I think it is quite likely that the discomfort that you experienced is related to recent alcohol use and its irritation of your stomach.  I plan to give you 2 weeks of Protonix.  You should avoid alcohol as much as possible.  If you have recurrence of symptoms despite Protonix you can return to the emergency department or follow-up in clinic with your primary care provider.

## 2022-10-21 NOTE — ED TRIAGE NOTES
Pt here with abdominal pain that radiates into her back. Woke up feeling fine this AM. Had a glass of water and 30 min later had a sudden onset of upper abdominal pain that radiated into her back mostly on the left side. Vomited once. Denied fever. No diarrhea. Continues to have constant 3/10 pain with stabs of 7/10 pain. Pain is worse after eating.      Triage Assessment     Row Name 10/21/22 0542       Triage Assessment (Adult)    Airway WDL WDL       Respiratory WDL    Respiratory WDL WDL       Skin Circulation/Temperature WDL    Skin Circulation/Temperature WDL WDL       Cardiac WDL    Cardiac WDL WDL       Peripheral/Neurovascular WDL    Peripheral Neurovascular WDL WDL       Cognitive/Neuro/Behavioral WDL    Cognitive/Neuro/Behavioral WDL WDL

## 2023-04-12 ENCOUNTER — HOSPITAL ENCOUNTER (EMERGENCY)
Facility: CLINIC | Age: 23
Discharge: HOME | End: 2023-04-12
Payer: COMMERCIAL

## 2023-04-12 ENCOUNTER — APPOINTMENT (OUTPATIENT)
Dept: GENERAL RADIOLOGY | Facility: CLINIC | Age: 23
End: 2023-04-12
Payer: COMMERCIAL

## 2023-04-12 VITALS
SYSTOLIC BLOOD PRESSURE: 122 MMHG | HEIGHT: 71 IN | HEART RATE: 102 BPM | BODY MASS INDEX: 27.3 KG/M2 | OXYGEN SATURATION: 97 % | TEMPERATURE: 99 F | WEIGHT: 195 LBS | RESPIRATION RATE: 16 BRPM | DIASTOLIC BLOOD PRESSURE: 73 MMHG

## 2023-04-12 DIAGNOSIS — J06.9 VIRAL URI WITH COUGH: ICD-10-CM

## 2023-04-12 LAB — GROUP A STREP BY PCR: NOT DETECTED

## 2023-04-12 PROCEDURE — 87651 STREP A DNA AMP PROBE: CPT

## 2023-04-12 PROCEDURE — 99213 OFFICE O/P EST LOW 20 MIN: CPT | Mod: 25

## 2023-04-12 PROCEDURE — G0463 HOSPITAL OUTPT CLINIC VISIT: HCPCS | Mod: 25

## 2023-04-12 PROCEDURE — 71046 X-RAY EXAM CHEST 2 VIEWS: CPT

## 2023-04-12 ASSESSMENT — ENCOUNTER SYMPTOMS
DIARRHEA: 0
FEVER: 0
MUSCULOSKELETAL NEGATIVE: 1
SHORTNESS OF BREATH: 1
RHINORRHEA: 0
COUGH: 1
EYES NEGATIVE: 1
ABDOMINAL PAIN: 0
VOMITING: 0
SORE THROAT: 1
NAUSEA: 0
GASTROINTESTINAL NEGATIVE: 1

## 2023-04-12 ASSESSMENT — ACTIVITIES OF DAILY LIVING (ADL): ADLS_ACUITY_SCORE: 35

## 2023-04-12 NOTE — ED PROVIDER NOTES
History     Chief Complaint   Patient presents with     Cough     Cough since after easter. Reports temp of 99.7 at home.     HPI  Radha Noonan is a 23 year old female who presents to the  for concern of cough, shortness of breath, and sore throat.  Patient reports she started to feel unwell starting 2 days ago.  Reports at that time she developed a cough.  Over the past days she has also noted some shortness of breath, sore throat, and low-grade fevers.  Patient reports that she was recently with her family for Easter where there were several people who were ill at the time.  She did perform the COVID test at home yesterday which was negative.  Patient denies any abdominal pain, nausea, vomiting, diarrhea, chest pain, dizziness or any other new concerns today.    Allergies:  No Known Allergies    Problem List:    Patient Active Problem List    Diagnosis Date Noted     Attention deficit hyperactivity disorder (ADHD), predominantly inattentive type 08/02/2018     Priority: Medium     Cognitive impairment 12/14/2016     Priority: Medium     Post-concussion headache 04/13/2016     Priority: Medium     Vestibular dysfunction 04/13/2016     Priority: Medium     Vision disturbance 04/13/2016     Priority: Medium     Depression 08/03/2015     Priority: Medium     Seeing therapist at [facility] ([facility] Started on Zoloft 50 mg (2/17/17). Will follow-up in 2-3 weeks       Concussion with no loss of consciousness 12/16/2014     Priority: Medium     Post concussion syndrome 12/14/2014     Priority: Medium     Seen by concussion clinic 12/8/14, after 3 days symptom free at rest is to retake the impact test       Chalazion 08/16/2014     Priority: Medium     Orthostatic hypotension 08/16/2014     Priority: Medium     Other acne 10/23/2012     Priority: Medium     Vaso vagal episode 02/11/2012     Priority: Medium     Has had several fainting spells. Evaluated by peds cardiology in 2010 - consistent with vaso-vagal  "syncope. Tall stature but no other stigmata of Marfan, no family history of Marfan syndrome.  Evaluated again 2015-see scan, holter moniter ordered and was normal, ekg normal, increase hydration and salt intake, check lipid profile at next well exam-no need for cardiology follow up          Past Medical History:    No past medical history on file.    Past Surgical History:    No past surgical history on file.    Family History:    No family history on file.    Social History:  Marital Status:  Single [1]        Medications:    amphetamine-dextroamphetamine (ADDERALL XR) 10 MG 24 hr capsule          Review of Systems   Constitutional: Negative for fever.   HENT: Positive for ear pain and sore throat. Negative for congestion, ear discharge and rhinorrhea.    Eyes: Negative.    Respiratory: Positive for cough and shortness of breath.    Cardiovascular: Negative for chest pain.   Gastrointestinal: Negative.  Negative for abdominal pain, diarrhea, nausea and vomiting.   Genitourinary: Negative.    Musculoskeletal: Negative.    Skin: Negative.    All other systems reviewed and are negative.      Physical Exam   BP: 122/73  Pulse: 102  Temp: 99  F (37.2  C)  Resp: 16  Height: 180.3 cm (5' 11\")  Weight: 88.5 kg (195 lb)  SpO2: 97 %      Physical Exam  Constitutional:       General: She is not in acute distress.     Appearance: Normal appearance. She is not ill-appearing or toxic-appearing.   HENT:      Head: Normocephalic.      Right Ear: Tympanic membrane and external ear normal.      Left Ear: Tympanic membrane and external ear normal.      Nose: No congestion.      Mouth/Throat:      Mouth: Mucous membranes are moist.      Pharynx: Posterior oropharyngeal erythema present.   Eyes:      General: No scleral icterus.     Conjunctiva/sclera: Conjunctivae normal.   Cardiovascular:      Rate and Rhythm: Normal rate.      Heart sounds: Normal heart sounds.   Pulmonary:      Effort: Pulmonary effort is normal. No respiratory " distress.      Breath sounds: Normal breath sounds.   Abdominal:      General: Abdomen is flat.   Musculoskeletal:      Cervical back: Neck supple.   Lymphadenopathy:      Cervical: No cervical adenopathy.   Skin:     General: Skin is warm and dry.      Capillary Refill: Capillary refill takes less than 2 seconds.   Neurological:      General: No focal deficit present.      Mental Status: She is alert.         ED Course                 Procedures        Results for orders placed or performed during the hospital encounter of 04/12/23 (from the past 24 hour(s))   Group A Streptococcus PCR Throat Swab    Specimen: Throat; Swab   Result Value Ref Range    Group A strep by PCR Not Detected Not Detected    Narrative    The Xpert Xpress Strep A test, performed on the [employer], is a rapid, qualitative in vitro diagnostic test for the detection of Streptococcus pyogenes (Group A ß-hemolytic Streptococcus, Strep A) in throat swab specimens from patients with signs and symptoms of pharyngitis. The Xpert Xpress Strep A test can be used as an aid in the diagnosis of Group A Streptococcal pharyngitis. The assay is not intended to monitor treatment for Group A Streptococcus infections. The Xpert Xpress Strep A test utilizes an automated real-time polymerase chain reaction (PCR) to detect Streptococcus pyogenes DNA.   Chest XR,  PA & LAT    Narrative    XR CHEST 2 VIEWS  4/12/2023 4:07 PM       INDICATION: cough, shortness of breath  COMPARISON: None       Impression    IMPRESSION: Negative chest.    YOSVANY YEUNG MD         SYSTEM ID:  [id number]       Medications - No data to display    Assessments & Plan (with Medical Decision Making)   Patient presents to the urgent care for complaints of fever, cough, shortness of breath, and sore throat.  Patient is afebrile on arrival and vital signs are otherwise reassuring.  She is not hypoxic.  Given the patient's complaint of shortness of breath I did perform a  chest x-ray today which was negative for any pneumonia, effusions, or any other acute pathology.  Testing was also performed for strep by PCR which was negative today.  History and physical are consistent with a viral upper respiratory infection.  Symptomatic care was reviewed with the patient.  Provided with handouts and recommendations of over-the-counter cold medications.  Recommended she return for any worsening symptoms such as increasing shortness of breath, chest pain, dizziness or any other new concerns.  Patient was discharged in good condition and is agreeable to the plan.    I have reviewed the nursing notes.    I have reviewed the findings, diagnosis, plan and need for follow up with the patient.        Discharge Medication List as of 4/12/2023  4:28 PM          Final diagnoses:   Viral URI with cough       4/12/2023   [facility] EMERGENCY DEPT    Disclaimer:  This note consists of symbols derived from keyboarding, dictation and/or voice recognition software.  As a result, there may be errors in the script that have gone undetected.  Please consider this when interpreting information found in this chart.       Bennett Box APRN CNP  04/12/23 3618

## 2023-04-12 NOTE — DISCHARGE INSTRUCTIONS
Symptomatic treatments as discussed.  Tylenol and ibuprofen as needed for fevers or discomfort.  Return for worsening.     For cough, dextromethorphan/guaifenesin combinations help loosen secretions and suppress cough safely without significant risk of sedation.      For nasal congestion and sinus pressure, pseudoephedrine (Sudafed) or phenylephrine is often helpful but it can cause elevations in blood pressure and insomnia.  Short courses of a nasal decongestant spray (Afrin or Neosinephrine) can be appropriate but their use should be restricted to 3 days due to the high risk of rebound congestion.  Use Coricidin as a decongestant if you have a history of hypertension.     For pain and fevers, acetaminophen (Tylenol) is most appropriate.  Ibuprofen (Advil) or naproxen (Aleve) are useful too and last longer but they can cause elevation of blood pressure or stomach problems.     Sometimes the cause of your sinusitis is from allergies.  You may want to try taking a daily antihistamines such as Claritin, Zyrtec, or Allegra - all over the counter medications.

## 2023-11-14 ENCOUNTER — OFFICE VISIT (OUTPATIENT)
Dept: URGENT CARE | Facility: URGENT CARE | Age: 23
End: 2023-11-14
Payer: COMMERCIAL

## 2023-11-14 VITALS
WEIGHT: 192 LBS | OXYGEN SATURATION: 99 % | HEART RATE: 71 BPM | RESPIRATION RATE: 18 BRPM | BODY MASS INDEX: 26.78 KG/M2 | DIASTOLIC BLOOD PRESSURE: 72 MMHG | SYSTOLIC BLOOD PRESSURE: 129 MMHG | TEMPERATURE: 98.7 F

## 2023-11-14 DIAGNOSIS — S61.211A LACERATION OF LEFT INDEX FINGER WITHOUT FOREIGN BODY WITHOUT DAMAGE TO NAIL, INITIAL ENCOUNTER: Primary | ICD-10-CM

## 2023-11-14 PROCEDURE — 99203 OFFICE O/P NEW LOW 30 MIN: CPT | Mod: 25 | Performed by: PHYSICIAN ASSISTANT

## 2023-11-14 PROCEDURE — 90471 IMMUNIZATION ADMIN: CPT | Performed by: PHYSICIAN ASSISTANT

## 2023-11-14 PROCEDURE — 90715 TDAP VACCINE 7 YRS/> IM: CPT | Performed by: PHYSICIAN ASSISTANT

## 2023-11-14 NOTE — PATIENT INSTRUCTIONS
Keep clean  May use bacitracin ointment and a bandage  Monitor for signs of infection including fever, thick yellow/white discharge, a hard or soft lump under the skin or increasing pain, redness, warmth and/or swelling. If you have any of these or persistent symptoms that are not healing, follow up with your primary care provider.

## 2023-11-14 NOTE — PROGRESS NOTES
Assessment & Plan     Laceration of left index finger without foreign body without damage to nail, initial encounter  - TDAP 7+ (ADACEL,BOOSTRIX)    No sign of infection. TDAP updated today.    Return in about 1 week (around 11/21/2023) for visit with primary care provider if not improving.     [facility] Carrillo [facility] U[facility]    Subjective   Radha Noonan is a 23 year old who presents for the following health issues     Patient presents with:  Urgent Care  Laceration: Per patient states two days ago while at the dog park she had a small laceration on left pointer finger, cut has closed but now having pain from finger that is radiating up arm, shoulder to head. Per patient pain is getting worst   Patient Request for Note/Letter: Requesting letter for work     HPI    Emi presents clinic today after cutting her finger on a fence post at a dog park.  She states this happened 3 days ago.  She was attempting to open the gate when her dog pulled and she cut her finger on the metal gate.  She cleaned it with peroxide once.  Is been slightly tender but she has had no problems using her hand.  She did also bump the knuckle of her middle finger.  This is still minimally tender but she has full range of motion and strength in her hand.  Last tetanus vaccine was in 10/2012.    Review of Systems   ROS negative except as stated above.      Objective    /72   Pulse 71   Temp 98.7  F (37.1  C) (Tympanic)   Resp 18   Wt 87.1 kg (192 lb)   LMP 10/20/2023   SpO2 99%   BMI 26.78 kg/m    Physical Exam   GENERAL: healthy, alert and no distress  SKIN: superficial laceration of dorsal left index finger, no sign of infection  MS: no gross musculoskeletal defects noted, no edema    No results found for any visits on 11/14/23.

## 2024-03-16 ENCOUNTER — HOSPITAL ENCOUNTER (EMERGENCY)
Facility: CLINIC | Age: 24
Discharge: HOME | End: 2024-03-16
Attending: NURSE PRACTITIONER | Admitting: NURSE PRACTITIONER
Payer: COMMERCIAL

## 2024-03-16 ENCOUNTER — APPOINTMENT (OUTPATIENT)
Dept: GENERAL RADIOLOGY | Facility: CLINIC | Age: 24
End: 2024-03-16
Attending: NURSE PRACTITIONER
Payer: COMMERCIAL

## 2024-03-16 VITALS
TEMPERATURE: 98 F | RESPIRATION RATE: 20 BRPM | DIASTOLIC BLOOD PRESSURE: 74 MMHG | SYSTOLIC BLOOD PRESSURE: 113 MMHG | OXYGEN SATURATION: 100 % | HEART RATE: 65 BPM

## 2024-03-16 DIAGNOSIS — S60.012A CONTUSION OF LEFT THUMB WITHOUT DAMAGE TO NAIL, INITIAL ENCOUNTER: ICD-10-CM

## 2024-03-16 PROCEDURE — G0463 HOSPITAL OUTPT CLINIC VISIT: HCPCS | Performed by: NURSE PRACTITIONER

## 2024-03-16 PROCEDURE — 73140 X-RAY EXAM OF FINGER(S): CPT | Mod: LT

## 2024-03-16 PROCEDURE — 99213 OFFICE O/P EST LOW 20 MIN: CPT | Performed by: NURSE PRACTITIONER

## 2024-03-16 ASSESSMENT — COLUMBIA-SUICIDE SEVERITY RATING SCALE - C-SSRS
1. IN THE PAST MONTH, HAVE YOU WISHED YOU WERE DEAD OR WISHED YOU COULD GO TO SLEEP AND NOT WAKE UP?: NO
6. HAVE YOU EVER DONE ANYTHING, STARTED TO DO ANYTHING, OR PREPARED TO DO ANYTHING TO END YOUR LIFE?: NO
2. HAVE YOU ACTUALLY HAD ANY THOUGHTS OF KILLING YOURSELF IN THE PAST MONTH?: NO

## 2024-03-16 ASSESSMENT — ACTIVITIES OF DAILY LIVING (ADL)
ADLS_ACUITY_SCORE: 35
ADLS_ACUITY_SCORE: 35

## 2024-03-16 NOTE — DISCHARGE INSTRUCTIONS
X-rays were negative for fractures or deformity today.  Recommend that you continue to use ice, elevation if symptoms worsen despite recommended treatment plan please return.  Heat is recommended starting on day 3-4 per reabsorption of bruising.
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
